# Patient Record
Sex: MALE | Race: WHITE | NOT HISPANIC OR LATINO | URBAN - METROPOLITAN AREA
[De-identification: names, ages, dates, MRNs, and addresses within clinical notes are randomized per-mention and may not be internally consistent; named-entity substitution may affect disease eponyms.]

---

## 2017-04-12 ENCOUNTER — EMERGENCY (EMERGENCY)
Facility: HOSPITAL | Age: 32
LOS: 1 days | Discharge: HOME | End: 2017-04-12
Attending: EMERGENCY MEDICINE
Payer: MEDICAID

## 2017-04-12 VITALS
SYSTOLIC BLOOD PRESSURE: 167 MMHG | WEIGHT: 184.97 LBS | HEART RATE: 112 BPM | DIASTOLIC BLOOD PRESSURE: 87 MMHG | HEIGHT: 67 IN | OXYGEN SATURATION: 95 % | TEMPERATURE: 98 F | RESPIRATION RATE: 20 BRPM

## 2017-04-12 VITALS
SYSTOLIC BLOOD PRESSURE: 128 MMHG | TEMPERATURE: 98 F | DIASTOLIC BLOOD PRESSURE: 63 MMHG | RESPIRATION RATE: 16 BRPM | OXYGEN SATURATION: 96 % | HEART RATE: 97 BPM

## 2017-04-12 DIAGNOSIS — J45.901 UNSPECIFIED ASTHMA WITH (ACUTE) EXACERBATION: ICD-10-CM

## 2017-04-12 PROCEDURE — 99284 EMERGENCY DEPT VISIT MOD MDM: CPT | Mod: 25

## 2017-04-12 PROCEDURE — 94640 AIRWAY INHALATION TREATMENT: CPT

## 2017-04-12 PROCEDURE — 96372 THER/PROPH/DIAG INJ SC/IM: CPT

## 2017-04-12 PROCEDURE — 99285 EMERGENCY DEPT VISIT HI MDM: CPT | Mod: 25

## 2017-04-12 RX ORDER — KETOROLAC TROMETHAMINE 30 MG/ML
30 SYRINGE (ML) INJECTION ONCE
Qty: 0 | Refills: 0 | Status: DISCONTINUED | OUTPATIENT
Start: 2017-04-12 | End: 2017-04-12

## 2017-04-12 RX ORDER — IPRATROPIUM/ALBUTEROL SULFATE 18-103MCG
3 AEROSOL WITH ADAPTER (GRAM) INHALATION ONCE
Qty: 0 | Refills: 0 | Status: COMPLETED | OUTPATIENT
Start: 2017-04-12 | End: 2017-04-12

## 2017-04-12 RX ORDER — ALBUTEROL 90 UG/1
2 AEROSOL, METERED ORAL
Qty: 1 | Refills: 0 | OUTPATIENT
Start: 2017-04-12 | End: 2017-04-19

## 2017-04-12 RX ADMIN — Medication 3 MILLILITER(S): at 01:12

## 2017-04-12 RX ADMIN — Medication 3 MILLILITER(S): at 01:52

## 2017-04-12 RX ADMIN — Medication 60 MILLIGRAM(S): at 01:12

## 2017-04-12 RX ADMIN — Medication 30 MILLIGRAM(S): at 02:00

## 2017-04-12 RX ADMIN — Medication 30 MILLIGRAM(S): at 01:52

## 2017-04-12 NOTE — ED PROVIDER NOTE - MEDICAL DECISION MAKING DETAILS
32 y/o M pt w/ asthma exacerbation. Will give 60 of prednisone, Toradol for pain, duonebs, reassess. 30 y/o M pt w/ asthma exacerbation. Will give 60 of prednisone, Toradol for pain, duonebs, reassess. Pt declined CXR at this time. pt states he received xray 1 week ago at urgent care.

## 2017-04-12 NOTE — ED PROVIDER NOTE - OBJECTIVE STATEMENT
30 y/o M pt w/ PMHx of asthma presents to ED c/o asthma exacerbation today. Pt states that he tried his breathing treatments at home today to no relief. Pt notes that he has had a cough and cold this past week; pt just finished a course of steroids for bronchitis/asthma. Pt states that he comes in because he is still having wheezing and tightness. Pt denies fever, productive cough, or any other complaints. NKDA. 32 y/o M pt w/ PMHx of asthma presents to ED c/o asthma exacerbation today. Pt states that he tried his breathing treatments at home today to no relief. Pt notes that he has had a cough and cold this past week; pt just finished a course of steroids for bronchitis/asthma. Pt states he has been having increase in seasonal allergies and is complaining of wheezing and began having tightness tonight. Pt denies fever, productive cough, or any other complaints. NKDA.

## 2017-04-12 NOTE — ED PROVIDER NOTE - NS ED MD SCRIBE ATTENDING SCRIBE SECTIONS
PAST MEDICAL/SURGICAL/SOCIAL HISTORY/REVIEW OF SYSTEMS/PHYSICAL EXAM/HIV/VITAL SIGNS( Pullset)/HISTORY OF PRESENT ILLNESS/DISPOSITION

## 2020-08-06 NOTE — ED PROVIDER NOTE - QUALITY
RA: 24w1d  GDM  Recommendations 7/30:  Continue diet and activity goals  We are recommending for her to do 8 hour shifts with a 30min break for meal and 15min break for medical management.   Her GDM will require her to be able to eat and test blood sugars on a regular basis for health concerns  Can give option for freestyle [place]  She will need grief counseling -, she has deferred this.    As the pt hasn't updated she was contacted.  She states she'll update by email-they'd been out of town.       wheezing

## 2025-06-07 NOTE — ED PROVIDER NOTE - NSTIMEPROVIDERCAREINITIATE_GEN_ER
12-Apr-2017 00:33 Normal vision: sees adequately in most situations; can see medication labels, newsprint